# Patient Record
Sex: MALE | Race: ASIAN | NOT HISPANIC OR LATINO | Employment: UNEMPLOYED | URBAN - METROPOLITAN AREA
[De-identification: names, ages, dates, MRNs, and addresses within clinical notes are randomized per-mention and may not be internally consistent; named-entity substitution may affect disease eponyms.]

---

## 2022-04-28 ENCOUNTER — LAB (OUTPATIENT)
Dept: FAMILY MEDICINE | Facility: CLINIC | Age: 37
End: 2022-04-28
Payer: COMMERCIAL

## 2022-04-28 DIAGNOSIS — Z20.822 ENCOUNTER FOR LABORATORY TESTING FOR COVID-19 VIRUS: ICD-10-CM

## 2022-04-28 PROCEDURE — U0003 INFECTIOUS AGENT DETECTION BY NUCLEIC ACID (DNA OR RNA); SEVERE ACUTE RESPIRATORY SYNDROME CORONAVIRUS 2 (SARS-COV-2) (CORONAVIRUS DISEASE [COVID-19]), AMPLIFIED PROBE TECHNIQUE, MAKING USE OF HIGH THROUGHPUT TECHNOLOGIES AS DESCRIBED BY CMS-2020-01-R: HCPCS

## 2022-04-28 PROCEDURE — U0005 INFEC AGEN DETEC AMPLI PROBE: HCPCS

## 2022-04-29 LAB — SARS-COV-2 RNA RESP QL NAA+PROBE: NEGATIVE

## 2022-07-18 ENCOUNTER — APPOINTMENT (OUTPATIENT)
Dept: CT IMAGING | Facility: HOSPITAL | Age: 37
End: 2022-07-18
Attending: EMERGENCY MEDICINE
Payer: COMMERCIAL

## 2022-07-18 VITALS
HEIGHT: 65 IN | DIASTOLIC BLOOD PRESSURE: 77 MMHG | HEART RATE: 88 BPM | WEIGHT: 187 LBS | SYSTOLIC BLOOD PRESSURE: 110 MMHG | TEMPERATURE: 98.3 F | BODY MASS INDEX: 31.16 KG/M2 | OXYGEN SATURATION: 99 % | RESPIRATION RATE: 18 BRPM

## 2022-07-18 PROCEDURE — 250N000013 HC RX MED GY IP 250 OP 250 PS 637: Performed by: STUDENT IN AN ORGANIZED HEALTH CARE EDUCATION/TRAINING PROGRAM

## 2022-07-18 PROCEDURE — 70450 CT HEAD/BRAIN W/O DYE: CPT

## 2022-07-18 PROCEDURE — 99284 EMERGENCY DEPT VISIT MOD MDM: CPT | Mod: 25

## 2022-07-18 RX ORDER — ACETAMINOPHEN 325 MG/1
975 TABLET ORAL ONCE
Status: COMPLETED | OUTPATIENT
Start: 2022-07-18 | End: 2022-07-18

## 2022-07-18 RX ORDER — IBUPROFEN 600 MG/1
600 TABLET, FILM COATED ORAL ONCE
Status: COMPLETED | OUTPATIENT
Start: 2022-07-18 | End: 2022-07-18

## 2022-07-18 RX ADMIN — IBUPROFEN 600 MG: 600 TABLET, FILM COATED ORAL at 20:30

## 2022-07-18 RX ADMIN — ACETAMINOPHEN 975 MG: 325 TABLET ORAL at 20:30

## 2022-07-19 ENCOUNTER — HOSPITAL ENCOUNTER (EMERGENCY)
Facility: HOSPITAL | Age: 37
Discharge: HOME | End: 2022-07-19
Attending: EMERGENCY MEDICINE | Admitting: EMERGENCY MEDICINE
Payer: COMMERCIAL

## 2022-07-19 DIAGNOSIS — R51.9 ACUTE NONINTRACTABLE HEADACHE, UNSPECIFIED HEADACHE TYPE: ICD-10-CM

## 2022-07-19 NOTE — ED PROVIDER NOTES
EMERGENCY DEPARTMENT ENCOUNTER      NAME: Montrell Severino  AGE: 37 year old male  YOB: 1985  MRN: [id number]  EVALUATION DATE & TIME: No admission date for patient encounter.    PCP: No Ref-Primary, Physician    ED PROVIDER: Dion Khan M.D.      Chief Complaint   Patient presents with     Headache         FINAL IMPRESSION:  1. Acute nonintractable headache, unspecified headache type          ED COURSE & MEDICAL DECISION MAKING:    Pertinent Labs & Imaging studies reviewed. (See chart for details)  37 year old male presents to the Emergency Department for evaluation of headache. Patient appears non toxic with stable vitals signs, patient is afebrile with no tachycardia or hypoxia, no increased work of breathing. Overall exam is benign.  Lungs are clear and abdomen is benign, I cannot reproduce any significant tenderness with deep palpation of my abdominal exam.  He has a GCS of 15 with no focal neurodeficits.  Headache was not sudden or maximal at onset, he is denied any eye pain or vision loss, fevers, neck pain or stiffness, falls or trauma, or other systemic signs of illness.  Clinically, given history and exam noted, nothing to suggest subarachnoid hemorrhage, meningitis, CVA, no temporal tenderness or eye pain to suggest temporal arteritis, nothing to suggest acute angle-closure glaucoma, trigeminal neuralgia, mastoiditis, otitis, referred dental pain, or other more malicious etiology of symptoms.  Considered but low suspicion for depressed skull fracture, intracranial bleed or mass.  Again no fever or other systemic signs of illness, reassuring vitals and exam, no indication for emergent laboratory studies.  We will obtain CT imaging of the head and the patient was given Tylenol ibuprofen here.    Reassessment: CT imaging reported no acute concerning findings.  Repeat exam was benign the patient appears quite well and comfortable, he states that his pain is almost resolved.  With negative work-up,  "benign exam and overall well appearance, reassuring vitals, feel he is safe for discharge and close follow-up.  Will recommend follow-up with his primary care provider or our [place] [facility] in the next 5 to 7 days for continued outpatient management evaluation and otherwise recommend conservative management with Tylenol ibuprofen, rest and close follow-up.  Discussed all these findings and recommendations with the patient felt reassured and comfortable discharge.  Return precautions provided.    At the conclusion of the encounter I discussed the results of all of the tests and the disposition. The questions were answered and return precautions provided. The patient or family acknowledged understanding and was agreeable with the care plan.     10:34 PM I met with the patient, obtained history, performed an initial exam, and discussed options and plan for diagnostics and treatment here in the ED.   12:09 AM Repeat exam was benign. Patient reports current pain is nearly gone. Discussed findings and plan for discharge with close follow-up.       MEDICATIONS GIVEN IN THE EMERGENCY:  Medications   ibuprofen (ADVIL/MOTRIN) tablet 600 mg (600 mg Oral Given 7/18/22 2030)   acetaminophen (TYLENOL) tablet 975 mg (975 mg Oral Given 7/18/22 2030)       NEW PRESCRIPTIONS STARTED AT TODAY'S ER VISIT  There are no discharge medications for this patient.           =================================================================    HPI    Patient information was obtained from: patient    Use of Intrepreter: N/A         Tar Po is a 37 year old male who presents with a headache.    Patient says that he exercised last night (7/17/2022) around 8:00 PM. Prior to taking a shower, patient's vision became blurry and thought a shower would help resolve this. After his shower he felt \"not happy\". He went to bed around midnight but woke up at 3:00 AM and felt a pressure headache on the left side of his head that radiates to the back of " "his head. He took a Tylenol at this time but was unable to fall back asleep. He also says that he feels more tired than usual. He took tylenol 12 hours ago (11 AM) with no alleviation of pain, but was given pain medication in triage which slightly alleviated his pain. He denies any fall, head trauma, or syncope.    Patient has no pertinent medical history on file. States he usually does not get headaches. Patient denies vomiting, loss of vision, photophobia, rash, cough, chest pain, shortness of breath, fever, or any other complaints at this time.     REVIEW OF SYSTEMS   Constitutional:  Positive for fatigue. Denies fever, chills  Eye: Positive for blurry vision (resolved). Denies loss of vision or photophobia.   Respiratory:  Denies productive cough or increased work of breathing  Cardiovascular:  Denies chest pain, palpitations  GI:  Denies abdominal pain, nausea, vomiting, or change in bowel or bladder habits   Musculoskeletal:  Denies any new muscle/joint swelling  Skin:  Denies rash   Neurologic:  Positive for headache (left, radiates to back). Denies focal weakness or syncope.   All systems negative except as marked.     PAST MEDICAL HISTORY:  No past medical history on file.    PAST SURGICAL HISTORY:  No past surgical history on file.      CURRENT MEDICATIONS:    Prior to Admission medications    Not on File        ALLERGIES:  No Known Allergies    FAMILY HISTORY:  No family history on file.    SOCIAL HISTORY:   Social History     Socioeconomic History     Marital status:        VITALS:  Patient Vitals for the past 24 hrs:   BP Temp Temp src Pulse Resp SpO2 Height Weight   07/18/22 2025 110/77 98.3  F (36.8  C) Temporal 88 18 99 % 1.651 m (5' 5\") 84.8 kg (187 lb)        PHYSICAL EXAM    Constitutional:  Awake, alert, in no apparent distress  HENT:  Normocephalic, Atraumatic. Bilateral external ears normal. Oropharynx moist. Nose normal. Neck- Normal range of motion with no guarding, No midline cervical " tenderness, Supple, No stridor. No temporal tenderness.  Eyes:  PERRL, EOMI with no signs of entrapment, Conjunctiva normal, No discharge.   Respiratory:  Normal breath sounds, No respiratory distress, No wheezing.    Cardiovascular:  Normal heart rate, Normal rhythm, No appreciable rubs or gallops.   GI:  Soft, No tenderness, No distension, No palpable masses  Musculoskeletal:  Intact distal pulses, No edema. Good range of motion in all major joints. No tenderness to palpation or major deformities noted.  Integument:  Warm, Dry, No erythema, No rash.   Neurologic:  Alert & oriented, Normal motor function, Normal sensory function, No focal deficits noted. Cranial nerves II-XII intact.  Psychiatric:  Affect normal, Judgment normal, Mood normal.     LAB:  All pertinent labs reviewed and interpreted.  Results for orders placed or performed during the hospital encounter of 07/19/22   CT Head w/o Contrast    Impression    IMPRESSION:  1.  No acute intracranial abnormality.       RADIOLOGY:  CT Head w/o Contrast   Final Result   IMPRESSION:   1.  No acute intracranial abnormality.             EKG:      I have independently reviewed and interpreted the EKG(s) documented above.    PROCEDURES:            I, Roverto Yu, am serving as a scribe to document services personally performed by Dion Khan MD, based on my observation and the provider's statements to me. I, Dion Khan MD attest that Roverto Yu is acting in a scribe capacity, has observed my performance of the services and has documented them in accordance with my direction.    Dion Khan M.D.  Emergency Medicine  [facility] EMERGENCY DEPARTMENT  [facility] [facility] [id number] [phone]  Dept: [phone]      Dion Khan MD  07/19/22 0035

## 2022-07-19 NOTE — ED TRIAGE NOTES
Pt presents with left sided headache that radiates to back. Denies injury. Also complains of feeling tired. Pt took tylenol with no relief.     Triage Assessment     Row Name 07/18/22 2027       Triage Assessment (Adult)    Airway WDL WDL       Respiratory WDL    Respiratory WDL WDL       Skin Circulation/Temperature WDL    Skin Circulation/Temperature WDL WDL       Cardiac WDL    Cardiac WDL WDL       Peripheral/Neurovascular WDL    Peripheral Neurovascular WDL WDL       Cognitive/Neuro/Behavioral WDL    Cognitive/Neuro/Behavioral WDL WDL

## 2022-07-19 NOTE — DISCHARGE INSTRUCTIONS
You can take 650 mg of tylenol every 6-8 hours (no more than 3000 mg in 24 hours).  You can take 400 mg of ibuprofen with food every 6-8 hours (no more than 3200 mg in 24 hours).   If needed you can alternate between the two (take tylenol, then 3 hours later take a dose of ibuprofen, then 3 hours later take a dose of tylenol)

## 2024-01-03 ENCOUNTER — HOSPITAL ENCOUNTER (EMERGENCY)
Facility: HOSPITAL | Age: 39
Discharge: HOME | End: 2024-01-03
Attending: EMERGENCY MEDICINE | Admitting: EMERGENCY MEDICINE
Payer: COMMERCIAL

## 2024-01-03 ENCOUNTER — APPOINTMENT (OUTPATIENT)
Dept: RADIOLOGY | Facility: HOSPITAL | Age: 39
End: 2024-01-03
Attending: EMERGENCY MEDICINE
Payer: COMMERCIAL

## 2024-01-03 VITALS
TEMPERATURE: 100.1 F | OXYGEN SATURATION: 97 % | HEART RATE: 100 BPM | SYSTOLIC BLOOD PRESSURE: 103 MMHG | DIASTOLIC BLOOD PRESSURE: 62 MMHG | RESPIRATION RATE: 16 BRPM

## 2024-01-03 DIAGNOSIS — J10.1 INFLUENZA B: ICD-10-CM

## 2024-01-03 LAB
FLUAV RNA SPEC QL NAA+PROBE: NEGATIVE
FLUBV RNA RESP QL NAA+PROBE: POSITIVE
RSV RNA SPEC NAA+PROBE: NEGATIVE
SARS-COV-2 RNA RESP QL NAA+PROBE: NEGATIVE

## 2024-01-03 PROCEDURE — 96374 THER/PROPH/DIAG INJ IV PUSH: CPT

## 2024-01-03 PROCEDURE — 258N000003 HC RX IP 258 OP 636: Performed by: EMERGENCY MEDICINE

## 2024-01-03 PROCEDURE — 99284 EMERGENCY DEPT VISIT MOD MDM: CPT | Mod: 25

## 2024-01-03 PROCEDURE — 250N000011 HC RX IP 250 OP 636: Performed by: EMERGENCY MEDICINE

## 2024-01-03 PROCEDURE — 87637 SARSCOV2&INF A&B&RSV AMP PRB: CPT | Performed by: EMERGENCY MEDICINE

## 2024-01-03 PROCEDURE — 96361 HYDRATE IV INFUSION ADD-ON: CPT

## 2024-01-03 PROCEDURE — 250N000013 HC RX MED GY IP 250 OP 250 PS 637: Performed by: EMERGENCY MEDICINE

## 2024-01-03 PROCEDURE — 71046 X-RAY EXAM CHEST 2 VIEWS: CPT

## 2024-01-03 RX ORDER — KETOROLAC TROMETHAMINE 15 MG/ML
15 INJECTION, SOLUTION INTRAMUSCULAR; INTRAVENOUS ONCE
Status: COMPLETED | OUTPATIENT
Start: 2024-01-03 | End: 2024-01-03

## 2024-01-03 RX ORDER — ACETAMINOPHEN 325 MG/1
975 TABLET ORAL ONCE
Status: COMPLETED | OUTPATIENT
Start: 2024-01-03 | End: 2024-01-03

## 2024-01-03 RX ORDER — ALBUTEROL SULFATE 90 UG/1
2 AEROSOL, METERED RESPIRATORY (INHALATION) EVERY 6 HOURS PRN
Qty: 18 G | Refills: 0 | Status: SHIPPED | OUTPATIENT
Start: 2024-01-03

## 2024-01-03 RX ORDER — INHALER, ASSIST DEVICES
SPACER (EA) MISCELLANEOUS
Qty: 1 EACH | Refills: 0 | Status: SHIPPED | OUTPATIENT
Start: 2024-01-03

## 2024-01-03 RX ADMIN — SODIUM CHLORIDE 1000 ML: 9 INJECTION, SOLUTION INTRAVENOUS at 23:04

## 2024-01-03 RX ADMIN — KETOROLAC TROMETHAMINE 15 MG: 15 INJECTION, SOLUTION INTRAMUSCULAR; INTRAVENOUS at 23:04

## 2024-01-03 RX ADMIN — ACETAMINOPHEN 975 MG: 325 TABLET ORAL at 22:54

## 2024-01-03 ASSESSMENT — ACTIVITIES OF DAILY LIVING (ADL): ADLS_ACUITY_SCORE: 35

## 2024-01-04 NOTE — ED PROVIDER NOTES
EMERGENCY DEPARTMENT ENCOUNTER      NAME: Montrell Severino  AGE: 38 year old male  YOB: 1985  MRN: [id number]  EVALUATION DATE & TIME: No admission date for patient encounter.    PCP: No Ref-Primary, Physician    ED PROVIDER: Pawan Domingo D.O.      Chief Complaint   Patient presents with    Fever       FINAL IMPRESSION:  1. Influenza B        ED COURSE & MEDICAL DECISION MAKING:    10:32 PM I met with the patient to gather history and to perform my initial exam. I discussed the plan for care while in the Emergency Department.  11:35 PM We discussed the plan for discharge and the patient is agreeable. Reviewed supportive cares, symptomatic treatment, outpatient follow up, and reasons to return to the Emergency Department. All questions and concerns were addressed. Patient to be discharged by ED RN.           Pertinent Labs & Imaging studies reviewed. (See chart for details)  38 year old male presents to the Emergency Department for evaluation of fever, cough, body aches, headache, congestion, other URI type symptoms.  Initial differential did include COVID-19, influenza A, influenza B, RSV.  Less likely represent pneumonia based on his clinical presentation, chest x-ray did not show any evidence of consolidation or other signs suggestive of pneumonia.  Swab testing came back positive for influenza B, which would be consistent with the patient's symptoms.  After medications in the emergency department his symptoms have seemed to improve, the patient is comfortable discharged home.  I do not believe he requires admission.  Believe the tachycardia that the patient had in the emergency department was likely secondary to his fever.  Return precautions were discussed    Medical Decision Making  Obtained supplemental history:Supplemental history obtained?: No  Reviewed external records: External records reviewed?: No  Care impacted by chronic illness:N/A  Care significantly affected by social determinants of  health:N/A  Did you consider but not order tests?: Work up considered but not performed and documented in chart, if applicable  Did you interpret images independently?: Independent interpretation of ECG and images noted in documentation, when applicable.  Consultation discussion with other provider:Did you involve another provider (consultant, , pharmacy, etc.)?: No  Discharge. I prescribed additional prescription strength medication(s) as charted. See documentation for any additional details.    At the conclusion of the encounter I discussed the results of all of the tests and the disposition. The questions were answered. The patient or family acknowledged understanding and was agreeable with the care plan.      HPI    Patient information was obtained from: patient     Use of : N/A        Montrell Severino is a 38 year old male who presents with flu symptoms.     The patient presents with fever, cough, headache, body aches, rhinorrhea, congestion, and shortness of breath since 12/31/23. He denies any sick contacts. No known allergies. He does not use tobacco or alcohol. He denies nausea, vomiting, diarrhea, or any other complaints at this time.       REVIEW OF SYSTEMS  Constitutional:  Denies weight loss or weakness. Positive for fever.  Eyes:  No pain, discharge, redness  HENT:  Denies ear pain. Positive for cough, rhinorrhea, congestion.  Respiratory: No wheeze or cough. Positive for shortness of breath and cough.  Cardiovascular:  No CP, palpitations  GI:  Denies abdominal pain, nausea, vomiting, diarrhea  : Denies dysuria, hematuria  Musculoskeletal:  Denies any new muscle/joint swelling or loss of function. Positive for myalgias.   Skin:  Denies rash, pallor  Neurologic:  Denies focal weakness or sensory changes. Positive for headache.  Lymph: Denies swollen nodes    All other systems negative unless noted in HPI.    PAST MEDICAL HISTORY:  No past medical history on file.    PAST SURGICAL HISTORY:  No past  surgical history on file.      CURRENT MEDICATIONS:    No current facility-administered medications for this encounter.     Current Outpatient Medications   Medication    albuterol (PROAIR HFA/PROVENTIL HFA/VENTOLIN HFA) 108 (90 Base) MCG/ACT inhaler    spacer (OPTICHAMBER YESSY) holding chamber         ALLERGIES:  No Known Allergies    FAMILY HISTORY:  No family history on file.    SOCIAL HISTORY:  Social History     Socioeconomic History    Marital status:        VITALS:  Patient Vitals for the past 24 hrs:   BP Temp Temp src Pulse Resp SpO2   01/03/24 2343 103/62 100.1  F (37.8  C) Oral 100 -- 97 %   01/03/24 2124 133/82 (!) 101.7  F (38.7  C) -- 105 16 99 %       PHYSICAL EXAM    VITAL SIGNS: /62   Pulse 100   Temp 100.1  F (37.8  C) (Oral)   Resp 16   SpO2 97%     General Appearance: Well-appearing, well-nourished, no acute distress   Head:  Normocephalic, without obvious abnormality, atraumatic  Eyes:  PERRL, conjunctiva/corneas clear, EOM's intact,  ENT:  Lips, mucosa, and tongue normal, membranes are moist without pallor  Neck:  Normal ROM, symmetrical, trachea midline    Cardio:  Regular rate and rhythm, no murmur, rub or gallop, 2+ pulses symmetric in all extremities  Pulm:  Clear to auscultation bilaterally, respirations unlabored,  Musculoskeletal: Full ROM, no edema, no cyanosis, good ROM of major joints  Integument:  Warm, Dry, No erythema, No rash.    Neurologic:  Alert & oriented.  No focal deficits appreciated.  Ambulatory.  Psychiatric:  Affect normal, Judgment normal, Mood normal.      LABS  Results for orders placed or performed during the hospital encounter of 01/03/24 (from the past 24 hour(s))   Symptomatic Influenza A/B, RSV, & SARS-CoV2 PCR (COVID-19) Nasopharyngeal    Specimen: Nasopharyngeal; Swab   Result Value Ref Range    Influenza A PCR Negative Negative    Influenza B PCR Positive (A) Negative    RSV PCR Negative Negative    SARS CoV2 PCR Negative Negative     Narrative    Testing was performed using the Xpert Xpress CoV2/Flu/RSV Assay on the [employer] GeneXpert Instrument. This test should be ordered for the detection of SARS-CoV-2, influenza, and RSV viruses in individuals who meet clinical and/or epidemiological criteria. Test performance is unknown in asymptomatic patients. This test is for in vitro diagnostic use under the FDA EUA for laboratories certified under CLIA to perform high or moderate complexity testing. This test has not been FDA cleared or approved. A negative result does not rule out the presence of PCR inhibitors in the specimen or target RNA in concentration below the limit of detection for the assay. If only one viral target is positive but coinfection with multiple targets is suspected, the sample should be re-tested with another FDA cleared, approved, or authorized test, if coinfection would change clinical management. This test was validated by the [facility]. These laboratories are certified under the Clinical Laboratory Improvement Amendments of 1988 (CLIA-88) as qualified to perform high complexity laboratory testing.   Chest XR,  PA & LAT    Narrative    EXAM: XR CHEST 2 VIEWS  LOCATION: [facility]  DATE: 1/3/2024    INDICATION: Fever, cough  COMPARISON: None.      Impression    IMPRESSION: Negative chest.         RADIOLOGY  Chest XR,  PA & LAT   Final Result   IMPRESSION: Negative chest.           I have independently interpreted the above image, no consolidation, or pneumothorax on chest x-ray. See radiology report for detail.        MEDICATIONS GIVEN IN THE EMERGENCY:  Medications   sodium chloride 0.9% BOLUS 1,000 mL (0 mLs Intravenous Stopped 1/3/24 5930)   ketorolac (TORADOL) injection 15 mg (15 mg Intravenous $Given 1/3/24 2303)   acetaminophen (TYLENOL) tablet 975 mg (975 mg Oral $Given 1/3/24 2254)       NEW PRESCRIPTIONS STARTED AT TODAY'S ER VISIT  Discharge Medication List as of 1/3/2024  11:46 PM        START taking these medications    Details   albuterol (PROAIR HFA/PROVENTIL HFA/VENTOLIN HFA) 108 (90 Base) MCG/ACT inhaler Inhale 2 puffs into the lungs every 6 hours as needed for shortness of breath, wheezing or cough, Disp-18 g, R-0, [facility] may dispense brand covered by insurance (Proair, or proventil or ventolin or generic albuterol inhaler)      spacer (OPTICHAMBER YESSY) holding chamber Use with albuterol, Disp-1 each, R-0, Local [employer]              I, Roverto Yu, am serving as a scribe to document services personally performed by Pawan Domingo D.O., based on my observations and the provider's statements to me.  I, Paawn Domingo D.O., attest that Roverto Yu is acting in a scribe capacity, has observed my performance of the services and has documented them in accordance with my direction.     Pawan Domingo D.O.  Emergency Medicine  [facility] EMERGENCY DEPARTMENT  [address] [place] [id number]  [phone]  Dept: [phone]       Pawan Domingo,   01/04/24 0205

## 2024-01-04 NOTE — ED TRIAGE NOTES
The patient reports fever, sore throat, and cough for 3 days. He denies nausea. He also thinks he might be constipated. Last bm yesterday.

## 2024-10-01 ENCOUNTER — APPOINTMENT (OUTPATIENT)
Dept: RADIOLOGY | Facility: HOSPITAL | Age: 39
End: 2024-10-01
Payer: COMMERCIAL

## 2024-10-01 ENCOUNTER — HOSPITAL ENCOUNTER (EMERGENCY)
Facility: HOSPITAL | Age: 39
Discharge: HOME | End: 2024-10-01
Payer: COMMERCIAL

## 2024-10-01 VITALS
OXYGEN SATURATION: 99 % | HEART RATE: 85 BPM | DIASTOLIC BLOOD PRESSURE: 86 MMHG | BODY MASS INDEX: 33.66 KG/M2 | TEMPERATURE: 98.5 F | HEIGHT: 66 IN | SYSTOLIC BLOOD PRESSURE: 132 MMHG | RESPIRATION RATE: 16 BRPM | WEIGHT: 209.44 LBS

## 2024-10-01 DIAGNOSIS — M25.532 LEFT WRIST PAIN: ICD-10-CM

## 2024-10-01 PROCEDURE — 99283 EMERGENCY DEPT VISIT LOW MDM: CPT

## 2024-10-01 PROCEDURE — 250N000013 HC RX MED GY IP 250 OP 250 PS 637

## 2024-10-01 PROCEDURE — 73110 X-RAY EXAM OF WRIST: CPT | Mod: LT

## 2024-10-01 RX ORDER — ACETAMINOPHEN 500 MG
1000 TABLET ORAL EVERY 6 HOURS PRN
Qty: 50 TABLET | Refills: 0 | Status: SHIPPED | OUTPATIENT
Start: 2024-10-01

## 2024-10-01 RX ORDER — ACETAMINOPHEN 325 MG/1
975 TABLET ORAL ONCE
Status: COMPLETED | OUTPATIENT
Start: 2024-10-01 | End: 2024-10-01

## 2024-10-01 RX ORDER — IBUPROFEN 600 MG/1
600 TABLET, FILM COATED ORAL EVERY 6 HOURS PRN
Qty: 30 TABLET | Refills: 0 | Status: SHIPPED | OUTPATIENT
Start: 2024-10-01

## 2024-10-01 RX ORDER — IBUPROFEN 600 MG/1
600 TABLET, FILM COATED ORAL ONCE
Status: COMPLETED | OUTPATIENT
Start: 2024-10-01 | End: 2024-10-01

## 2024-10-01 RX ADMIN — ACETAMINOPHEN 975 MG: 325 TABLET ORAL at 18:07

## 2024-10-01 RX ADMIN — IBUPROFEN 600 MG: 600 TABLET, FILM COATED ORAL at 18:07

## 2024-10-01 ASSESSMENT — ACTIVITIES OF DAILY LIVING (ADL)
ADLS_ACUITY_SCORE: 35
ADLS_ACUITY_SCORE: 35

## 2024-10-01 NOTE — ED TRIAGE NOTES
Pt had an accident 10 years ago and injured his left wrist.  Yesterday his wrist started hurting again.      Triage Assessment (Adult)       Row Name 10/01/24 1447          Triage Assessment    Airway WDL WDL        Respiratory WDL    Respiratory WDL WDL        Skin Circulation/Temperature WDL    Skin Circulation/Temperature WDL WDL        Cardiac WDL    Cardiac WDL WDL        Peripheral/Neurovascular WDL    Peripheral Neurovascular WDL WDL        Cognitive/Neuro/Behavioral WDL    Cognitive/Neuro/Behavioral WDL WDL

## 2024-10-01 NOTE — DISCHARGE INSTRUCTIONS
You were seen in the emergency department for pain in your left wrist.  Your x-ray is clear without any evidence of swelling, fracture, dislocation.  At this point, I have a high suspicion that you may have sprained your wrist pretty badly many years ago and now with overuse, it starts to cause you pain again.  You have been using more than usual per your report including boxing, lifting, etc.  This can cause a flareup of the pain.  We will give you a splint that you can wear.  You can take Tylenol and ibuprofen every 6 hours for pain.  You can ice the area.  I will give you the number for an orthopedic specialist.  You can contact them to make an appointment to discuss this further.  But at this point, I do not see any other significant causes of your pain here today.  Return if you develop any new or worsening symptoms.

## 2024-10-01 NOTE — ED PROVIDER NOTES
"EMERGENCY DEPARTMENT ENCOUNTER      NAME: Montrell Severino  AGE: 39 year old male  YOB: 1985  MRN: [id number]  EVALUATION DATE & TIME: 10/1/2024  4:41 PM    PCP: No Ref-Primary, Physician    ED PROVIDER: Sharyn Garcia PA-C    CHIEF COMPLAINT:  Left wrist pain    MEDICAL DECISION MAKING and ED course:  4:59 PM I met with the patient and obtained a history and performed a physical exam    ED Course as of 10/01/24 2005   Tue Oct 01, 2024   1700 Patient is a 39-year-old male with no pertinent past medical history presenting with left wrist pain since yesterday.  Reports that 10 years ago he fell onto his wrist and injured it.  It swelled up and was extremely painful but he never went to the doctor to get x-rays.  Intermittently when he uses his left wrist often he will notice that he will have worse pain and it.  3 weeks ago he got a punching bag and had been using it as well as helped his friend move and carry heavy boxes last week.  Yesterday he noticed that he had a lot of pain in his wrist and this started to swell up.  He asked his wife to grab onto his fingers and he is getting very hard to try to \"pull things back into place\".  After that he noticed a lot of pain in his wrist.  He has not taken anything for pain including Tylenol or ibuprofen.  He has no other recent injuries.  He denies any skin changes like redness or any wounds.    On exam, radial pulse 2+.  Able to flex extend the wrist without difficulty/limitations. No swelling, ecchymosis, erythema noted to the wrist.  No warmth.  No rashes.  No deformity. Able to move the hand/finger distally including  strength 5 /5.  Sensation intact distally.  Cap refill less than 2.     Will give Tylenol and ibuprofen and get an x-ray.  Patient was agreeable to this plan.     X-ray without any deformity, fractures, dislocations, effusions etc.  At this point, I suspect that he may have had an old injury possibly a sprain that is flaring up given excessive " overuse compared to his baseline use.  He also has not tried any analgesics including OTC medications so he was given Tylenol and ibuprofen here and recommended to continue with those at home.  Will give him a flexible splint for the left wrist for comfort and the number for [facility] if symptoms persist/worsen.  I do not suspect that this is any worse pathology including infection such as septic joint or cellulitis, vascular or nerve injury, gout, etc.. with him being neurovascularly intact distally, pain is pretty well-controlled, and no physical exam findings including skin changes, deformity, joint swelling, etc.. to explain his symptoms.  No pain in any other joints. Can move wrist in all directions so septic joint unlikely. Negative tinel sign so I have a lower suspicion for carpal tunnel although on the differential still. Either way, patient to be discharged with cloth splint and follow up. He was agreeable to this plan and he was discharged in stable condition.    1730 I discussed plan for discharge. Return precautions were discussed. Discharged by ED RN.     Medical Decision Making  Obtained supplemental history:Supplemental history obtained?: No  Reviewed external records: External records reviewed?: No  Care impacted by chronic illness:Documented in Chart  Care significantly affected by social determinants of health:N/A  Did you consider but not order tests?: Work up considered but not performed and documented in chart, if applicable  Did you interpret images independently?: My preliminary independent interpretation of images are left wrist xray without fracture, dislocation, effusion/swelling.  See radiology notes for final report.  Consultation discussion with other provider:Did you involve another provider (consultant, MH, pharmacy, etc.)?: No  Discharge. No recommendations on prescription strength medication(s). See documentation for any additional details.        0 minutes of critical care  time     MEDICATIONS GIVEN IN THE EMERGENCY:  Medications   acetaminophen (TYLENOL) tablet 975 mg (975 mg Oral $Given 10/1/24 1807)   ibuprofen (ADVIL/MOTRIN) tablet 600 mg (600 mg Oral $Given 10/1/24 1807)       NEW PRESCRIPTIONS STARTED AT TODAY'S ER VISIT  Discharge Medication List as of 10/1/2024  6:07 PM        START taking these medications    Details   acetaminophen (TYLENOL) 500 MG tablet Take 2 tablets (1,000 mg) by mouth every 6 hours as needed for pain., Disp-50 tablet, R-0, Local Print      ibuprofen (ADVIL/MOTRIN) 600 MG tablet Take 1 tablet (600 mg) by mouth every 6 hours as needed for moderate pain., Disp-30 tablet, R-0, Local Print           Discharge Medication List as of 10/1/2024  6:07 PM          =================================================================    HPI    Patient information was obtained from: Patient   Use of : N/A     Montrell Severino is a 39 year old male with no pertinent history who presents to this ED by walk in for evaluation of left wrist pain.     The patient injured his left wrist 10 years ago but was not seen by a provider at that time because the left wrist pain improved after a family member massaged the wrist. Since this initial injury, the patient has felt left wrist pain on and off but notes that it usually resolves after he moves the left wrist around and massages it.     About 2-3 weeks ago, the patient tried punching his son's punching bag. He noted no left wrist pain at this time. Last week, he helped his friend move and noted slight left wrist pain.    At ~4 AM this morning, the patient woke up from sleep due to left wrist pain. He was able to attend classes today but reported constant left wrist pain throughout the day. No left hand or left forearm pain. He returned home from his classes and had his wife massage the left wrist for him with no improvement of pain. He has not taken any pain medications either.     He denies any falls or tingling to the left  "hand. He has otherwise been in his usual state of health and has no other concerns at this time.     PHYSICAL EXAM    /86   Pulse 85   Temp 98.5  F (36.9  C) (Temporal)   Resp 16   Ht 1.676 m (5' 6\")   Wt 95 kg (209 lb 7 oz)   SpO2 99%   BMI 33.80 kg/m    Constitutional: Well developed, Well nourished, NAD, GCS 15  HENT: Normocephalic, Atraumatic, Bilateral external ears normal, Oropharynx normal, mucous membranes moist, Nose normal. Neck- Normal range of motion  Eyes: EOMI, Conjunctiva normal, No discharge.   Musculoskeletal: radial pulse 2+.  Able to flex extend the wrist without difficulty/limitations. No swelling, ecchymosis, erythema noted to the wrist.  No warmth.  No rashes.  No deformity. Able to move the hand/finger distally including  strength 5 /5.  Sensation intact distally.  Cap refill less than 2.    Neurologic: Alert & oriented x 3, Normal motor function, Normal sensory function, No focal deficits noted. Normal gait.    Psychiatric: Affect normal, Judgment normal, Mood normal. Cooperative.      LAB:  All pertinent labs reviewed and interpreted.  Results for orders placed or performed during the hospital encounter of 10/01/24   XR Wrist Left G/E 3 Views    Impression    IMPRESSION: No definite fracture is identified. There is normal joint spacing and alignment.        RADIOLOGY:  Reviewed all pertinent imaging. Please see official radiology report.  XR Wrist Left G/E 3 Views   Final Result   IMPRESSION: No definite fracture is identified. There is normal joint spacing and alignment.             I, Fernanda Leiva, am serving as a scribe to document services personally performed by Sharyn Garcia PA-C based on my observation and the provider's statements to me. I, Sharyn Garcia PA-C, attest that Fernanda Leiva is acting in a scribe capacity, has observed my performance of the services and has documented them in accordance with my direction.    Sharyn Garcia PA-C  [facility] " [facility] EMERGENCY DEPARTMENT  [address] [place] [id number]  [phone]      Sharyn Garcia PA-C  10/01/24 2006